# Patient Record
Sex: FEMALE | Race: WHITE | NOT HISPANIC OR LATINO | Employment: FULL TIME | URBAN - METROPOLITAN AREA
[De-identification: names, ages, dates, MRNs, and addresses within clinical notes are randomized per-mention and may not be internally consistent; named-entity substitution may affect disease eponyms.]

---

## 2022-06-27 ENCOUNTER — APPOINTMENT (EMERGENCY)
Dept: RADIOLOGY | Facility: HOSPITAL | Age: 27
End: 2022-06-27
Payer: COMMERCIAL

## 2022-06-27 ENCOUNTER — HOSPITAL ENCOUNTER (EMERGENCY)
Facility: HOSPITAL | Age: 27
Discharge: HOME | End: 2022-06-27
Attending: EMERGENCY MEDICINE
Payer: COMMERCIAL

## 2022-06-27 VITALS
HEART RATE: 100 BPM | WEIGHT: 209.88 LBS | OXYGEN SATURATION: 98 % | RESPIRATION RATE: 18 BRPM | SYSTOLIC BLOOD PRESSURE: 127 MMHG | DIASTOLIC BLOOD PRESSURE: 75 MMHG | TEMPERATURE: 101.7 F

## 2022-06-27 DIAGNOSIS — K08.89 TOOTHACHE: ICD-10-CM

## 2022-06-27 DIAGNOSIS — R50.9 FEVER: ICD-10-CM

## 2022-06-27 DIAGNOSIS — R07.89 CHEST WALL PAIN: Primary | ICD-10-CM

## 2022-06-27 DIAGNOSIS — H65.93 BILATERAL SEROUS OTITIS MEDIA: ICD-10-CM

## 2022-06-27 LAB
ATRIAL RATE: 96 BPM
ATRIAL RATE: 99 BPM
P AXIS: 44 DEGREES
P AXIS: 51 DEGREES
PR INTERVAL: 162 MS
PR INTERVAL: 168 MS
QRS AXIS: 68 DEGREES
QRS AXIS: 75 DEGREES
QRSD INTERVAL: 70 MS
QRSD INTERVAL: 88 MS
QT INTERVAL: 320 MS
QT INTERVAL: 324 MS
QTC INTERVAL: 401 MS
QTC INTERVAL: 403 MS
T WAVE AXIS: 30 DEGREES
T WAVE AXIS: 49 DEGREES
VENTRICULAR RATE: 92 BPM
VENTRICULAR RATE: 95 BPM

## 2022-06-27 PROCEDURE — 99285 EMERGENCY DEPT VISIT HI MDM: CPT

## 2022-06-27 PROCEDURE — 96372 THER/PROPH/DIAG INJ SC/IM: CPT

## 2022-06-27 PROCEDURE — 93005 ELECTROCARDIOGRAM TRACING: CPT

## 2022-06-27 PROCEDURE — 71045 X-RAY EXAM CHEST 1 VIEW: CPT

## 2022-06-27 PROCEDURE — 93010 ELECTROCARDIOGRAM REPORT: CPT | Performed by: INTERNAL MEDICINE

## 2022-06-27 PROCEDURE — 99285 EMERGENCY DEPT VISIT HI MDM: CPT | Performed by: EMERGENCY MEDICINE

## 2022-06-27 RX ORDER — PENICILLIN V POTASSIUM 250 MG/1
500 TABLET ORAL ONCE
Status: COMPLETED | OUTPATIENT
Start: 2022-06-27 | End: 2022-06-27

## 2022-06-27 RX ORDER — KETOROLAC TROMETHAMINE 30 MG/ML
30 INJECTION, SOLUTION INTRAMUSCULAR; INTRAVENOUS ONCE
Status: COMPLETED | OUTPATIENT
Start: 2022-06-27 | End: 2022-06-27

## 2022-06-27 RX ORDER — AMOXICILLIN AND CLAVULANATE POTASSIUM 875; 125 MG/1; MG/1
1 TABLET, FILM COATED ORAL 2 TIMES DAILY
Qty: 20 TABLET | Refills: 0 | Status: SHIPPED | OUTPATIENT
Start: 2022-06-27 | End: 2022-07-07

## 2022-06-27 RX ORDER — NAPROXEN 500 MG/1
500 TABLET ORAL 2 TIMES DAILY WITH MEALS
Qty: 20 TABLET | Refills: 0 | Status: SHIPPED | OUTPATIENT
Start: 2022-06-27 | End: 2022-07-07

## 2022-06-27 RX ADMIN — KETOROLAC TROMETHAMINE 30 MG: 30 INJECTION, SOLUTION INTRAMUSCULAR at 06:36

## 2022-06-27 RX ADMIN — PENICILLIN V POTASSIUM 500 MG: 250 TABLET, FILM COATED ORAL at 06:37

## 2022-06-27 NOTE — ED PROVIDER NOTES
History  Chief Complaint   Patient presents with    Dental Pain     Pt states she began with dental pain yesterday, and was prescribed an abx by an on call doc  Today has swollen lymph nodes and increased pain in her gums and back by wisdom teeth   Chest Pain     Pt states she was also dx with an "irregular heartbeat" and is having chest heaviness today  Patient is a 32year old female with increased left lower toothache since yesterday and got Rx called in by doctor on call but she has not picked it up yet  No fever  No N/V  (+) chest pain  Diffuse aches and had negative covid testing recently  No sob  States she drove here  [facility] checked on this patient and patient not found  No recent old records from this ED seen on computer system  Works in a correction  Has IUD and does not get her menstrual periods  (+) ear pain  History provided by:  Patient   used: No    Dental Pain  Associated symptoms: no fever    Chest Pain  Associated symptoms: no cough, no fever, no nausea, no shortness of breath and not vomiting        None       History reviewed  No pertinent past medical history  History reviewed  No pertinent surgical history  History reviewed  No pertinent family history  I have reviewed and agree with the history as documented  E-Cigarette/Vaping     E-Cigarette/Vaping Substances     Social History     Tobacco Use    Smoking status: Never Smoker    Smokeless tobacco: Never Used   Substance Use Topics    Alcohol use: Never    Drug use: Never       Review of Systems   Constitutional: Negative for fever  HENT: Positive for dental problem and ear pain  Respiratory: Negative for cough and shortness of breath  Cardiovascular: Positive for chest pain  Gastrointestinal: Negative for nausea and vomiting  Musculoskeletal: Positive for myalgias  All other systems reviewed and are negative        Physical Exam  Physical Exam  Vitals and nursing note reviewed  Constitutional:       General: She is in acute distress (moderate)  HENT:      Head: Normocephalic and atraumatic  Comments: Bilateral serous otitis media     Right Ear: Ear canal and external ear normal  There is no impacted cerumen  Left Ear: Ear canal and external ear normal  There is no impacted cerumen  Mouth/Throat:      Mouth: Mucous membranes are moist       Pharynx: Oropharynx is clear  No posterior oropharyngeal erythema  Comments: (+) left mandibular molar tenderness  Eyes:      General: No scleral icterus  Right eye: No discharge  Left eye: No discharge  Cardiovascular:      Rate and Rhythm: Regular rhythm  Tachycardia present  Heart sounds: Normal heart sounds  No murmur heard  Pulmonary:      Effort: Pulmonary effort is normal  No respiratory distress  Breath sounds: Normal breath sounds  No stridor  No wheezing, rhonchi or rales  Chest:      Chest wall: Tenderness (anterior) present  Abdominal:      General: Bowel sounds are normal       Palpations: Abdomen is soft  Tenderness: There is no abdominal tenderness  Musculoskeletal:         General: No deformity  Cervical back: Normal range of motion and neck supple  Right lower leg: No edema  Left lower leg: No edema  Lymphadenopathy:      Cervical: Cervical adenopathy (shotty anterior) present  Skin:     General: Skin is warm and dry  Findings: No erythema or rash  Neurological:      General: No focal deficit present  Mental Status: She is alert and oriented to person, place, and time  Psychiatric:      Comments: Somewhat anxious            Vital Signs  ED Triage Vitals   Temperature Pulse Respirations Blood Pressure SpO2   06/27/22 0635 06/27/22 0622 06/27/22 0622 06/27/22 0622 06/27/22 0622   (!) 101 7 °F (38 7 °C) 100 18 127/75 98 %      Temp Source Heart Rate Source Patient Position - Orthostatic VS BP Location FiO2 (%)   06/27/22 0635 06/27/22 3532 06/27/22 0622 06/27/22 0622 --   Oral Monitor Lying Right arm       Pain Score       06/27/22 0636       10 - Worst Possible Pain           Vitals:    06/27/22 0622   BP: 127/75   Pulse: 100   Patient Position - Orthostatic VS: Lying         Visual Acuity      ED Medications  Medications   ketorolac (TORADOL) injection 30 mg (30 mg Intramuscular Given 6/27/22 0636)   penicillin V potassium (VEETID) tablet 500 mg (500 mg Oral Given 6/27/22 3583)       Diagnostic Studies  Results Reviewed     None                 XR chest 1 view portable   ED Interpretation by Bobby Carcamo MD (06/27 8409)   No infiltrate read by me  Procedures  ECG 12 Lead Documentation Only    Date/Time: 6/27/2022 6:32 AM  Performed by: Bobby Carcamo MD  Authorized by: Bobby Carcamo MD     Indications / Diagnosis:  Chest pain  ECG reviewed by me, the ED Provider: yes    Patient location:  ED  Previous ECG:     Previous ECG:  Unavailable  Rate:     ECG rate:  95    ECG rate assessment: normal    Rhythm:     Rhythm: sinus rhythm    Ectopy:     Ectopy: none    QRS:     QRS axis:  Normal    QRS intervals:  Normal  Conduction:     Conduction normal: low voltage  ST segments:     ST segments:  Normal  T waves:     T waves: normal    Other findings:     Other findings: poor R wave progression    Comments:      I do not agree with computer reading of septal infarct, age undetermined  ED Course  ED Course as of 06/27/22 0715   Mon Jun 27, 2022   4617 Patient not sure of what abx was prescribed to her  Will give patient paper Rx for augmentin and if PCN was prescribed I explained to patient to take augmentin since OM as well  MDM  Number of Diagnoses or Management Options  Diagnosis management comments: DDx including but not limited to: dental maddie, dental infection, dental abscess, dry socket, gingivitis; doubt radha's angina   DDX including but not limited to: chest wall pain, pleurisy, costochondritis, pericarditis, myocarditis, PTX, pneumonia, GI etiology, OM, viral illness; doubt ACS or MI or dissection or PE or rhabdomyolysis  Amount and/or Complexity of Data Reviewed  Clinical lab tests: ordered and reviewed  Tests in the radiology section of CPT®: ordered and reviewed  Decide to obtain previous medical records or to obtain history from someone other than the patient: yes  Independent visualization of images, tracings, or specimens: yes        Disposition  Final diagnoses:   Chest wall pain   Fever   Bilateral serous otitis media   Toothache     Time reflects when diagnosis was documented in both MDM as applicable and the Disposition within this note     Time User Action Codes Description Comment    6/27/2022  7:13 AM Duana Sales Add [R07 89] Chest wall pain     6/27/2022  7:13 AM Duana Sales Add [R50 9] Fever     6/27/2022  7:13 AM Duana Sales Add [H65 93] Bilateral serous otitis media     6/27/2022  7:13 AM Duana Sales Add [K08 89] Toothache       ED Disposition     ED Disposition   Discharge    Condition   Stable    Date/Time   Mon Jun 27, 2022  7:13 AM    Comment   Светлана Hinkle discharge to home/self care  Follow-up Information     Follow up With Specialties Details Why [address] [place] [address],[address] [facility], [place] Family Medicine Call in 1 day Return sooner if increased pain, persistent fever, vomiting, diarrhea, rash, difficulty breathing, neck stiffness, drooling   [address] [facility]  [phone]      [facility] [address] and [address] [address]  Call in 1 day  [address]  [phone]          Patient's Medications   Discharge Prescriptions    AMOXICILLIN-CLAVULANATE (AUGMENTIN) 875-125 MG PER TABLET    Take 1 tablet by mouth 2 (two) times a day for 10 days       Start Date: 6/27/2022 End Date: 7/7/2022       Order Dose: 1 tablet Quantity: 20 tablet    Refills: 0    NAPROXEN (NAPROSYN) 500 MG TABLET    Take 1 tablet (500 mg total) by mouth 2 (two) times a day with meals for 10 days       Start Date: 6/27/2022 End Date: 7/7/2022       Order Dose: 500 mg       Quantity: 20 tablet    Refills: 0       No discharge procedures on file      PDMP Review     None          ED Provider  Electronically Signed by           Curly Dodson MD  06/27/22 2941

## 2022-06-27 NOTE — Clinical Note
Jaymie Barron was seen and treated in our emergency department on 6/27/2022  Diagnosis:     Suni    She may return on this date:     No work for two days  If you have any questions or concerns, please don't hesitate to call        Marquita Gonzalez MD    ______________________________           _______________          _______________  Hospital Representative                              Date                                Time